# Patient Record
Sex: FEMALE | Race: WHITE
[De-identification: names, ages, dates, MRNs, and addresses within clinical notes are randomized per-mention and may not be internally consistent; named-entity substitution may affect disease eponyms.]

---

## 2019-04-10 ENCOUNTER — HOSPITAL ENCOUNTER (EMERGENCY)
Dept: HOSPITAL 80 - FED | Age: 24
Discharge: HOME | End: 2019-04-10
Payer: COMMERCIAL

## 2019-04-10 VITALS — DIASTOLIC BLOOD PRESSURE: 60 MMHG | SYSTOLIC BLOOD PRESSURE: 126 MMHG

## 2019-04-10 DIAGNOSIS — M54.10: Primary | ICD-10-CM

## 2019-04-10 DIAGNOSIS — J45.909: ICD-10-CM

## 2019-04-10 DIAGNOSIS — M25.511: ICD-10-CM

## 2019-04-10 DIAGNOSIS — K21.9: ICD-10-CM

## 2019-04-10 NOTE — EDPHY
H & P


Stated Complaint: R should pain X 1 week, unknown cause


Time Seen by Provider: 04/10/19 20:33


HPI/ROS: 





CHIEF COMPLAINT:  Right shoulder and upper back pain





HISTORY OF PRESENT ILLNESS:  This is a healthy 23-year-old right-hand-dominant 

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 

that extends into her shoulder.  Pain has been present for the past week.  It 

initially began in her mid anterior arm but now seems to be more localized in 

the shoulder and upper arm region.  She describes it as a deep very 

uncomfortable painful sensation.  She has been repeatedly rubbing that area 

without relief.  She has tried CBD or oil.  She has also been taking ibuprofen 

800 mg 2-3 times daily.  She notices some numbness if she sleeps on that side.  

She feels is if the right arm has been weaker than the left but she has been 

using it normally.  She spent a week climbing over spring break and wonders if 

this could be part of the problem.  No bowel or bladder problems.





She tells me that when she was about 13 years old she had right shoulder pain.  

She had an MRI scan at that time.  She was ultimately told that she had a 

rotator cuff infection, viral.  She has no other information about this and has 

not had trouble with that shoulder again until now.





REVIEW OF SYSTEMS:


A ten system review of systems was performed and is negative with the exception 

of the items mentioned in the HPI.





Past medical history:  Negative





Past surgical history:  Negative





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.  She 

does not use tobacco products.





General Appearance:  Alert.  Vital signs reviewed.  


Respiratory:  Lungs are clear to auscultation; no wheezes, rales, or rhonchi.


Cardiovascular:  Regular rate and rhythm; no murmur, rub, or gallop.


Gastrointestinal:  Abdomen is soft and nontender, no masses or organomegaly, 

bowel sounds normal.


Skin:  Warm and dry, no rashes on exposed skin, normal color.


Back:  Nontender to palpation over the thoracolumbar spine. No CVAT.


Extremities:  No lower extremity edema, no calf tenderness or swelling.


Neurological:  Alert and oriented.  Moving all four extremities easily and 

equally.  Rubbing her right shoulder and upper arm repetitively.


Pulses:  2+ radial pulses bilaterally.


  Strength is 5 over 5 both upper extremities with testing of all major motor 

groups.  Altered sensation to light touch over the right upper extremity 

laterally but with a somewhat patchy distribution.  Deep tendon reflexes are 2+ 

in the biceps and triceps bilaterally.  


Psychiatric:  Normal affect.





- Personal History


LMP (Females 10-55): IUD In Place


Current Tetanus/Diphtheria Vaccine: Yes


Current Tetanus Diphtheria and Acellular Pertussis (TDAP): Yes





- Medical/Surgical History


Hx Asthma: Yes


Hx Chronic Respiratory Disease: No


Hx Diabetes: No


Hx Cardiac Disease: No


Hx Renal Disease: No


Hx Cirrhosis: No


Hx Alcoholism: No


Hx HIV/AIDS: No


Hx Splenectomy or Spleen Trauma: No


Other PMH: .GERD,fx ribs,asthma, vocal cord dysfunction.





- Social History


Smoking Status: Never smoked


Constitutional: 


 Initial Vital Signs











Temperature (C)  36.7 C   04/10/19 20:18


 


Heart Rate  57 L  04/10/19 20:18


 


Respiratory Rate  16   04/10/19 20:18


 


Blood Pressure  126/60 H  04/10/19 20:18


 


O2 Sat (%)  99   04/10/19 20:18








 











O2 Delivery Mode               Room Air














Allergies/Adverse Reactions: 


 





Beef Containing Products Allergy (Intermediate, Verified 09/13/16 21:17)


 Other-Enter Comments


hops Allergy (Intermediate, Verified 09/13/16 21:17)


 Other-Enter Comments


peas Allergy (Intermediate, Verified 09/13/16 21:17)


 Other-Enter Comments


Pork/Porcine Containing Products Allergy (Intermediate, Verified 09/13/16 21:17)


 Other-Enter Comments


blueberry Allergy (Verified 09/13/16 21:17)


 


eggplant Allergy (Intermediate, Uncoded 09/13/16 21:17)


 Other-Enter Comments


eggp Allergy (Uncoded 09/13/16 21:17)


 








Home Medications: 














 Medication  Instructions  Recorded


 


Cyclobenzaprine [Flexeril 10 MG 10 mg PO TID PRN #15 tab 04/10/19





(*)]  


 


Gabapentin [Neurontin 300 MG (*)] 300 mg PO TID #21 cap 04/10/19


 


Hydrocodone/APAP 5/325 [Norco 1 - 2 tab PO Q4 PRN #10 tab 04/10/19





5/325 (RX)]  














Medical Decision Making


ED Course/Re-evaluation: 





23-year-old female with right shoulder and arm pain.  By her description this 

sounds like radicular pain.  Neurologic exam is normal with the exception of 

the sensory exam.  I do not recommend imaging at this point in time.  She has 

had no trauma and I do not think that x-rays will be helpful.  I am repeat 

recommending symptomatic treatment and close follow-up.  She is advised to 

continue with anti-inflammatory medication.  A lidocaine patch was applied in 

the emergency department.  I am starting her on gabapentin.  She is also given 

a prescription for small quantity of muscle relaxant and a small quantity of 

Norco.  Precautions were given concerning these last 2 medications.  She is 

referred to [facility] and also to a primary care physician.


Differential Diagnosis: 





I considered a differential diagnosis that includes but is not limited to 

fracture, dislocation, radiculopathy, compartment syndrome, regional pain 

syndrome, sprain/strain, vascular occlusion.





- Data Points


Medications Given: 


 








Discontinued Medications





Hydrocodone Bitart/Acetaminophen (Norco 5/325mg Prepack#6)  1 btl TAKEHOME 

EDNOW ONE


   Stop: 04/10/19 20:55


   Last Admin: 04/10/19 21:08 Dose:  1 btl


Gabapentin (Neurontin)  600 mg PO EDNOW ONE


   Stop: 04/10/19 20:49


   Last Admin: 04/10/19 20:55 Dose:  600 mg


Miscellaneous Information (Patch Removal)  1 ea TD DAILY21 BUDDY


   Stop: 10/07/19 20:59


   Last Admin: 04/10/19 20:58 Dose:  1 ea


Miscellaneous Medication (Icy Hot Lidocaine/Menthol 4%/1% Patch)  1 patch TD 

EDNOW ONE


   Stop: 04/10/19 20:49


   Last Admin: 04/10/19 20:58 Dose:  1 patch








Departure





- Departure


Disposition: Home, Routine, Self-Care


Clinical Impression: 


 Radiculopathy affecting upper extremity





Shoulder pain, acute


Qualifiers:


 Laterality: right Qualified Code(s): M25.511 - Pain in right shoulder





Condition: Good


Instructions:  Cervical Radiculopathy (ED), Shoulder Pain (ED)


Additional Instructions: 


I think that your pain is most likely coming from pressure on a nerve root i as 

it leaves your neck and supplies your arm.





Continue the ibuprofen 800 mg every 6-8 hours while awake.





Try the lidocaine patches.  You can buy these over-the-counter in any drug 

store.





Try the muscle relaxant, Flexeril, to see if it helps.  It will make you 

sleepy.  I am also providing you with a small quantity of Norco.  This is an 

opiate pain medication mixed with Tylenol.  You need to be careful while taking 

it.  Do not drive or engage in otherwise potentially dangerous activities when 

you take this medication.  Do not drink alcohol with this medication.





I am also prescribing gabapentin which is a good medication for nerve pain.





I am referring you to a primary care doctor, Dr. Cotton.  I am also referring you 

to [facility].  I recommend that you make an appointment at [facility]--call 

tomorrow.  That way you have an appointment in case you are not improving.





If you develop worsening pain, significant weakness, any trouble controlling 

your bowels or bladder--you should be re-evaluated immediately.


Referrals: 


[facility] [Outside] - As per Instructions


Marisela Cotton MD [Medical Doctor] - As per Instructions


Stand Alone Forms:  School Excuse, Narcotic Guidelines


Prescriptions: 


Cyclobenzaprine [Flexeril 10 MG (*)] 10 mg PO TID PRN #15 tab


 PRN Reason: Spasms


Gabapentin [Neurontin 300 MG (*)] 300 mg PO TID #21 cap


Hydrocodone/APAP 5/325 [Norco 5/325 (RX)] 1 - 2 tab PO Q4 PRN #10 tab


 PRN Reason: pain

## 2023-06-16 ENCOUNTER — APPOINTMENT (RX ONLY)
Dept: URBAN - NONMETROPOLITAN AREA CLINIC 31 | Facility: CLINIC | Age: 28
Setting detail: DERMATOLOGY
End: 2023-06-16

## 2023-06-16 DIAGNOSIS — L23.89 ALLERGIC CONTACT DERMATITIS DUE TO OTHER AGENTS: ICD-10-CM | Status: INADEQUATELY CONTROLLED

## 2023-06-16 PROCEDURE — ? INTRAMUSCULAR KENALOG

## 2023-06-16 PROCEDURE — ? PRESCRIPTION

## 2023-06-16 PROCEDURE — ? COUNSELING

## 2023-06-16 PROCEDURE — 96372 THER/PROPH/DIAG INJ SC/IM: CPT

## 2023-06-16 RX ORDER — BETAMETHASONE DIPROPIONATE 0.5 MG/G
OINTMENT TOPICAL
Qty: 45 | Refills: 3 | Status: ERX | COMMUNITY
Start: 2023-06-16

## 2023-06-16 RX ADMIN — BETAMETHASONE DIPROPIONATE: 0.5 OINTMENT TOPICAL at 00:00

## 2023-06-16 ASSESSMENT — LOCATION DETAILED DESCRIPTION DERM
LOCATION DETAILED: RIGHT RIB CAGE
LOCATION DETAILED: RIGHT SUPERIOR POSTERIOR NECK
LOCATION DETAILED: RIGHT ANTERIOR PROXIMAL THIGH
LOCATION DETAILED: LEFT ANTERIOR PROXIMAL THIGH
LOCATION DETAILED: LEFT LATERAL ABDOMEN
LOCATION DETAILED: LEFT RIB CAGE
LOCATION DETAILED: RIGHT LATERAL ABDOMEN
LOCATION DETAILED: RIGHT BUTTOCK

## 2023-06-16 ASSESSMENT — LOCATION SIMPLE DESCRIPTION DERM
LOCATION SIMPLE: POSTERIOR NECK
LOCATION SIMPLE: ABDOMEN
LOCATION SIMPLE: RIGHT THIGH
LOCATION SIMPLE: LEFT THIGH
LOCATION SIMPLE: RIGHT BUTTOCK

## 2023-06-16 ASSESSMENT — LOCATION ZONE DERM
LOCATION ZONE: TRUNK
LOCATION ZONE: NECK
LOCATION ZONE: LEG

## 2023-06-16 NOTE — PROCEDURE: INTRAMUSCULAR KENALOG
See attachment in media for HPI and PE completed today.   
Detail Level: None
Concentration (Mg/Ml) Of Additional Medication: 2.5
Consent: The risks of atrophy were reviewed with the patient.
Treatment Number (Optional): 1
Concentration (Mg/Ml): 40.0
Add Option For Additional Mediation: No
Kenalog Preparation: kenalog

## 2023-06-16 NOTE — PROCEDURE: COUNSELING
Detail Level: Detailed
Patient Specific Counseling (Will Not Stick From Patient To Patient): PHOTOS TODAY, CLINICAL EXAM C/W ACUTE CONTACT DERMATITIS, AFFECTED AREAS ARE AREAS OF INCREASED HEAT AND SWEATING, AS WELL AS POINTS OF CLOTHING CONTACT, POSSIBLY ALSO POINTS OF CONTACT WITH NEW CLIMBING GEAR, CONSULT WITH KS, YASIR, REVIEW OF PHOTOS VIA TEXT, AGREES WITH ASSESSMENT, RECS IM KENALOG 4O MG AND TOPICAL BETAMETHASONE OINTMENT, PT WILL ALSO ADD ZYRTEC 10-20 MG QD, F/U RECHECK MONDAY, CONSIDER BX IF CLINICALLY INDICATED, MAY ALSO CONSIDER PATCH TESTING WHEN FURTHER OUT FROM ACUTE SXS

## 2023-06-19 ENCOUNTER — APPOINTMENT (RX ONLY)
Dept: URBAN - NONMETROPOLITAN AREA CLINIC 31 | Facility: CLINIC | Age: 28
Setting detail: DERMATOLOGY
End: 2023-06-19

## 2023-06-19 DIAGNOSIS — R21 RASH AND OTHER NONSPECIFIC SKIN ERUPTION: ICD-10-CM | Status: INADEQUATELY CONTROLLED

## 2023-06-19 PROCEDURE — ? COUNSELING

## 2023-06-19 PROCEDURE — 99214 OFFICE O/P EST MOD 30 MIN: CPT

## 2023-06-19 PROCEDURE — ? PRESCRIPTION MEDICATION MANAGEMENT

## 2023-06-19 PROCEDURE — ? PRESCRIPTION

## 2023-06-19 PROCEDURE — ? ORDER TESTS

## 2023-06-19 RX ORDER — PREDNISONE 20 MG/1
TABLET ORAL
Qty: 21 | Refills: 0 | Status: ERX | COMMUNITY
Start: 2023-06-19

## 2023-06-19 RX ORDER — HYDROXYZINE HYDROCHLORIDE 10 MG/1
TABLET, FILM COATED ORAL
Qty: 90 | Refills: 0 | Status: ERX | COMMUNITY
Start: 2023-06-19

## 2023-06-19 RX ADMIN — PREDNISONE: 20 TABLET ORAL at 00:00

## 2023-06-19 RX ADMIN — HYDROXYZINE HYDROCHLORIDE: 10 TABLET, FILM COATED ORAL at 00:00

## 2023-06-19 ASSESSMENT — LOCATION ZONE DERM
LOCATION ZONE: LEG
LOCATION ZONE: FEET
LOCATION ZONE: AXILLAE
LOCATION ZONE: TRUNK
LOCATION ZONE: SCALP

## 2023-06-19 ASSESSMENT — LOCATION DETAILED DESCRIPTION DERM
LOCATION DETAILED: LEFT MEDIAL DORSAL FOOT
LOCATION DETAILED: RIGHT MEDIAL DORSAL FOOT
LOCATION DETAILED: RIGHT RIB CAGE
LOCATION DETAILED: POSTERIOR MID-PARIETAL SCALP
LOCATION DETAILED: RIGHT ANTERIOR PROXIMAL THIGH
LOCATION DETAILED: RIGHT POSTERIOR LATERAL PROXIMAL THIGH
LOCATION DETAILED: RIGHT MEDIAL DISTAL PRETIBIAL REGION
LOCATION DETAILED: RIGHT AXILLARY VAULT
LOCATION DETAILED: LEFT POSTERIOR LATERAL PROXIMAL THIGH
LOCATION DETAILED: LEFT RIB CAGE
LOCATION DETAILED: LEFT ANTERIOR PROXIMAL THIGH
LOCATION DETAILED: LEFT AXILLARY VAULT

## 2023-06-19 ASSESSMENT — LOCATION SIMPLE DESCRIPTION DERM
LOCATION SIMPLE: LEFT THIGH
LOCATION SIMPLE: RIGHT FOOT
LOCATION SIMPLE: ABDOMEN
LOCATION SIMPLE: LEFT AXILLARY VAULT
LOCATION SIMPLE: RIGHT PRETIBIAL REGION
LOCATION SIMPLE: LEFT FOOT
LOCATION SIMPLE: RIGHT THIGH
LOCATION SIMPLE: RIGHT AXILLARY VAULT
LOCATION SIMPLE: POSTERIOR SCALP

## 2023-06-19 NOTE — PROCEDURE: COUNSELING
Patient Specific Counseling (Will Not Stick From Patient To Patient): CONSULT WITH AC WHO ALSO EVAL PT, PER THIS EVAL DX FAVORS SYSTEMIC ETIOLOGY, ADDITIONAL DDX TO INCLUDE SDRIFE AND AGEP, WILL ADD ON PO PRED TAPER AND PT WILL  AND START BETAMETHASONE AND HYDROXYZINE, ADDITIONAL DETAILS OF PLAN AND F/U AS BELOW
Detail Level: Simple

## 2023-06-19 NOTE — PROCEDURE: PRESCRIPTION MEDICATION MANAGEMENT
Detail Level: Zone
Continue Regimen: ORAL ZYRTEC, 10-20 MG/D, AQUAPHOR AND VASELINE AS DIRECTED
Render In Strict Bullet Format?: No
Plan: F/U 2-3 D FOR RECHECK
Initiate Treatment: PREDNISONE TAPER TODAY,  BETAMETHASONE, USE AS DIRECTED, ORAL HYDROXYZINE ALSO AS DIRECTED
Discontinue Regimen: TOPICAL CALADRYL OR TOPICAL BENADRYL

## 2023-06-19 NOTE — PROCEDURE: ORDER TESTS
Expected Date Of Service: 06/19/2023
Performing Laboratory: 0
Billing Type: Third-Party Bill
Bill For Surgical Tray: no

## 2023-06-22 ENCOUNTER — APPOINTMENT (RX ONLY)
Dept: URBAN - NONMETROPOLITAN AREA CLINIC 31 | Facility: CLINIC | Age: 28
Setting detail: DERMATOLOGY
End: 2023-06-22

## 2023-06-22 DIAGNOSIS — R21 RASH AND OTHER NONSPECIFIC SKIN ERUPTION: ICD-10-CM | Status: RESOLVING

## 2023-06-22 PROCEDURE — ? COUNSELING

## 2023-06-22 PROCEDURE — 99213 OFFICE O/P EST LOW 20 MIN: CPT

## 2023-06-22 ASSESSMENT — LOCATION DETAILED DESCRIPTION DERM
LOCATION DETAILED: RIGHT PROXIMAL PRETIBIAL REGION
LOCATION DETAILED: LEFT ANTERIOR MEDIAL PROXIMAL UPPER ARM
LOCATION DETAILED: RIGHT ANKLE
LOCATION DETAILED: LEFT PROXIMAL PRETIBIAL REGION
LOCATION DETAILED: LEFT ANKLE
LOCATION DETAILED: RIGHT AXILLARY TAIL OF BREAST
LOCATION DETAILED: RIGHT ANTERIOR PROXIMAL THIGH
LOCATION DETAILED: LEFT ANTERIOR PROXIMAL THIGH

## 2023-06-22 ASSESSMENT — LOCATION ZONE DERM
LOCATION ZONE: LEG
LOCATION ZONE: ARM
LOCATION ZONE: TRUNK

## 2023-06-22 ASSESSMENT — LOCATION SIMPLE DESCRIPTION DERM
LOCATION SIMPLE: LEFT THIGH
LOCATION SIMPLE: RIGHT ANKLE
LOCATION SIMPLE: LEFT PRETIBIAL REGION
LOCATION SIMPLE: RIGHT BREAST
LOCATION SIMPLE: RIGHT PRETIBIAL REGION
LOCATION SIMPLE: RIGHT THIGH
LOCATION SIMPLE: LEFT UPPER ARM
LOCATION SIMPLE: LEFT ANKLE

## 2023-06-22 NOTE — PROCEDURE: COUNSELING
Patient Specific Counseling (Will Not Stick From Patient To Patient): PHOTOS TODAY, SUBJECTIVE AND OBJECTIVE SX IMPROVEMENT, EXPECT CONTINUED IMPROVEMENT COMPLETION OF PO PRED, CONTINUE WITH TOPICAL BETAMETHASONE, HYDROXYZINE AND ZYRTEC AS DIRECTED, F/U FOLLOWING TAPER COMPLETION, SOONER PRN, PT MAY ALSO CONSIDER PATCH TESTING FURTHER OUT FROM SX RESOLUTION, WILL REVISIT THIS AT NEXT F/U
Detail Level: Detailed

## 2024-11-21 ENCOUNTER — APPOINTMENT (RX ONLY)
Dept: URBAN - NONMETROPOLITAN AREA CLINIC 31 | Facility: CLINIC | Age: 29
Setting detail: DERMATOLOGY
End: 2024-11-21

## 2024-11-21 DIAGNOSIS — L20.89 OTHER ATOPIC DERMATITIS: ICD-10-CM | Status: INADEQUATELY CONTROLLED

## 2024-11-21 PROCEDURE — ? COUNSELING

## 2024-11-21 PROCEDURE — ? PRESCRIPTION

## 2024-11-21 PROCEDURE — 99213 OFFICE O/P EST LOW 20 MIN: CPT

## 2024-11-21 RX ADMIN — Medication: at 00:00

## 2024-11-21 ASSESSMENT — LOCATION SIMPLE DESCRIPTION DERM: LOCATION SIMPLE: UPPER BACK

## 2024-11-21 ASSESSMENT — LOCATION DETAILED DESCRIPTION DERM: LOCATION DETAILED: SUPERIOR THORACIC SPINE

## 2024-11-21 ASSESSMENT — LOCATION ZONE DERM: LOCATION ZONE: TRUNK

## 2024-11-21 NOTE — PROCEDURE: COUNSELING
Patient Specific Counseling (Will Not Stick From Patient To Patient): Offered dupixent, I think it will help, especially since she also has eosinophilic esophagitis. discussed elimination diet as optimal way of seeing if there is a food allergy. will also refer to allergist in [facility] since [place] already [place] [facility] [place] [facility]. advised her to fu with the GI doc since she hasn't in 2 years since she had endoscopy
Detail Level: Detailed

## 2025-03-11 ENCOUNTER — APPOINTMENT (OUTPATIENT)
Dept: URBAN - NONMETROPOLITAN AREA CLINIC 34 | Facility: CLINIC | Age: 30
Setting detail: DERMATOLOGY
End: 2025-03-11

## 2025-03-11 DIAGNOSIS — L50.1 IDIOPATHIC URTICARIA: ICD-10-CM

## 2025-03-11 DIAGNOSIS — Q828 OTHER SPECIFIED ANOMALIES OF SKIN: ICD-10-CM

## 2025-03-11 DIAGNOSIS — L43.8 OTHER LICHEN PLANUS: ICD-10-CM

## 2025-03-11 DIAGNOSIS — D22 MELANOCYTIC NEVI: ICD-10-CM

## 2025-03-11 DIAGNOSIS — Q826 OTHER SPECIFIED ANOMALIES OF SKIN: ICD-10-CM

## 2025-03-11 DIAGNOSIS — Z12.83 ENCOUNTER FOR SCREENING FOR MALIGNANT NEOPLASM OF SKIN: ICD-10-CM

## 2025-03-11 DIAGNOSIS — L57.8 OTHER SKIN CHANGES DUE TO CHRONIC EXPOSURE TO NONIONIZING RADIATION: ICD-10-CM

## 2025-03-11 DIAGNOSIS — Q819 OTHER SPECIFIED ANOMALIES OF SKIN: ICD-10-CM

## 2025-03-11 PROBLEM — L85.8 OTHER SPECIFIED EPIDERMAL THICKENING: Status: ACTIVE | Noted: 2025-03-11

## 2025-03-11 PROBLEM — D22.5 MELANOCYTIC NEVI OF TRUNK: Status: ACTIVE | Noted: 2025-03-11

## 2025-03-11 PROBLEM — L30.9 DERMATITIS, UNSPECIFIED: Status: ACTIVE | Noted: 2025-03-11

## 2025-03-11 PROCEDURE — 11104 PUNCH BX SKIN SINGLE LESION: CPT

## 2025-03-11 PROCEDURE — ? BIOPSY BY PUNCH METHOD

## 2025-03-11 PROCEDURE — ? COUNSELING

## 2025-03-11 PROCEDURE — ? SUNSCREEN RECOMMENDATIONS

## 2025-03-11 PROCEDURE — 99203 OFFICE O/P NEW LOW 30 MIN: CPT | Mod: 25

## 2025-03-11 PROCEDURE — ? PRESCRIPTION

## 2025-03-11 RX ORDER — CLOBETASOL PROPIONATE 0.5 MG/G
OINTMENT TOPICAL
Qty: 45 | Refills: 0 | Status: ERX | COMMUNITY
Start: 2025-03-11

## 2025-03-11 RX ADMIN — CLOBETASOL PROPIONATE: 0.5 OINTMENT TOPICAL at 00:00

## 2025-03-11 ASSESSMENT — LOCATION DETAILED DESCRIPTION DERM
LOCATION DETAILED: LEFT ULNAR DORSAL HAND
LOCATION DETAILED: LEFT ANTERIOR PROXIMAL UPPER ARM
LOCATION DETAILED: INFERIOR THORACIC SPINE
LOCATION DETAILED: 2ND WEB SPACE LEFT HAND
LOCATION DETAILED: RIGHT RADIAL DORSAL HAND
LOCATION DETAILED: 2ND WEB SPACE LEFT HAND
LOCATION DETAILED: UPPER STERNUM

## 2025-03-11 ASSESSMENT — LOCATION ZONE DERM
LOCATION ZONE: ARM
LOCATION ZONE: HAND
LOCATION ZONE: TRUNK
LOCATION ZONE: HAND

## 2025-03-11 ASSESSMENT — LOCATION SIMPLE DESCRIPTION DERM
LOCATION SIMPLE: LEFT UPPER ARM
LOCATION SIMPLE: LEFT HAND
LOCATION SIMPLE: LEFT HAND
LOCATION SIMPLE: UPPER BACK
LOCATION SIMPLE: RIGHT HAND
LOCATION SIMPLE: CHEST

## 2025-03-11 NOTE — HPI: PREVENTATIVE SKIN CHECK
What Is The Reason For Today's Visit?: Full Body Skin Examination
Additional History: Spot of concern : none

## 2025-03-11 NOTE — HPI: RASH
How Severe Is Your Rash?: moderate
Is This A New Presentation, Or A Follow-Up?: Rash
Additional History: Pt was given hydroxyzine and prednisone, pt was at [facility] and was told it was eczema, and was given steroid shot year ago

## 2025-03-11 NOTE — PROCEDURE: BIOPSY BY PUNCH METHOD
Detail Level: Detailed
Was A Bandage Applied: Yes
Punch Size In Mm: 4
Size Of Lesion In Cm (Optional): 0
Depth Of Punch Biopsy: dermis
Biopsy Type: H and E
Anesthesia Type: 1% lidocaine with epinephrine
Anesthesia Volume In Cc: 3
Hemostasis: None
Epidermal Sutures: 3-0 Prolene
Wound Care: Petrolatum
Dressing: bandage
Suture Removal: 5 days
Patient Will Remove Sutures At Home?: No
Lab: [id number]
Lab Facility: [id number]
Consent: Written consent was obtained and risks were reviewed including but not limited to scarring, infection, bleeding, scabbing, incomplete removal, nerve damage and allergy to anesthesia.
Post-Care Instructions: I reviewed with the patient in detail post-care instructions. Patient is to keep the biopsy site dry overnight, and then apply bacitracin twice daily until healed. Patient may apply hydrogen peroxide soaks to remove any crusting.
Home Suture Removal Text: Patient was provided a home suture removal kit and will remove their sutures at home.  If they have any questions or difficulties they will call the office.
Notification Instructions: Patient will be notified of biopsy results. However, patient instructed to call the office if not contacted within 2 weeks.
Billing Type: Third-Party Bill
Information: Selecting Yes will display possible errors in your note based on the variables you have selected. This validation is only offered as a suggestion for you. PLEASE NOTE THAT THE VALIDATION TEXT WILL BE REMOVED WHEN YOU FINALIZE YOUR NOTE. IF YOU WANT TO FAX A PRELIMINARY NOTE YOU WILL NEED TO TOGGLE THIS TO 'NO' IF YOU DO NOT WANT IT IN YOUR FAXED NOTE.